# Patient Record
Sex: FEMALE | Race: WHITE | NOT HISPANIC OR LATINO | Employment: OTHER | URBAN - METROPOLITAN AREA
[De-identification: names, ages, dates, MRNs, and addresses within clinical notes are randomized per-mention and may not be internally consistent; named-entity substitution may affect disease eponyms.]

---

## 2023-11-03 ENCOUNTER — OFFICE VISIT (OUTPATIENT)
Dept: NEUROSURGERY | Facility: CLINIC | Age: 88
End: 2023-11-03
Payer: MEDICARE

## 2023-11-03 VITALS
TEMPERATURE: 97.6 F | RESPIRATION RATE: 16 BRPM | SYSTOLIC BLOOD PRESSURE: 122 MMHG | HEIGHT: 64 IN | WEIGHT: 134.4 LBS | HEART RATE: 81 BPM | BODY MASS INDEX: 22.94 KG/M2 | DIASTOLIC BLOOD PRESSURE: 84 MMHG

## 2023-11-03 DIAGNOSIS — M54.16 RIGHT LUMBAR RADICULOPATHY: ICD-10-CM

## 2023-11-03 DIAGNOSIS — S32.058A: Primary | ICD-10-CM

## 2023-11-03 PROBLEM — S32.059A: Status: ACTIVE | Noted: 2023-11-03

## 2023-11-03 PROCEDURE — 1125F AMNT PAIN NOTED PAIN PRSNT: CPT | Performed by: NEUROLOGICAL SURGERY

## 2023-11-03 PROCEDURE — 1036F TOBACCO NON-USER: CPT | Performed by: NEUROLOGICAL SURGERY

## 2023-11-03 PROCEDURE — 99215 OFFICE O/P EST HI 40 MIN: CPT | Performed by: NEUROLOGICAL SURGERY

## 2023-11-03 PROCEDURE — 1159F MED LIST DOCD IN RCRD: CPT | Performed by: NEUROLOGICAL SURGERY

## 2023-11-03 RX ORDER — POTASSIUM &MAGNESIUM ASPARTATE 250-250 MG
500 CAPSULE ORAL
COMMUNITY

## 2023-11-03 RX ORDER — FAMOTIDINE 20 MG/1
20 TABLET, FILM COATED ORAL DAILY
COMMUNITY

## 2023-11-03 ASSESSMENT — PAIN SCALES - GENERAL: PAINLEVEL: 8

## 2023-11-03 NOTE — PROGRESS NOTES
[facility] [place]  Department of Neurological Surgery  Established Patient Visit    History of Present Illness  Brittny Astudillo is a 88 y.o. year old female who presents to the spine clinic in follow up with with her  to go over the low back pain and right leg pain that she has been experiencing now for about a year.  Patient states that after her last lumbar operation she did well for a number of years.  Last summer she was working in the garden and she fell and after the fall she had significant pain in her right leg.  She does occasionally get some symptoms on the left side but it is predominantly the right hamstring it is worse when she tries to bend over.  She is wearing a lumbar brace which gives her some back support and some relief of the back pain.  She had plain x-rays which demonstrate that she has significant osteoporosis.  She also has an old L1 compression fracture that dates back at least to 2018.  She had an MRI done this summer.  And on the MRI I believe she has a right L5 pars fracture and so the facet is no longer functional.  She has not lost control of her bowel or bladder.    Patient's BMI is Body mass index is 23.07 kg/m².    14/14 systems reviewed and negative other than what is listed in the history of present illness    Patient Active Problem List   Diagnosis    Closed fracture of fifth lumbar vertebra (CMS/HCC)     Past Medical History:   Diagnosis Date    Personal history of other malignant neoplasm of skin     History of malignant neoplasm of skin     Past Surgical History:   Procedure Laterality Date    APPENDECTOMY  05/08/2018    Appendectomy    BACK SURGERY  05/08/2018    Back Surgery    COLONOSCOPY  05/08/2018    Colonoscopy    HERNIA REPAIR  05/08/2018    Hernia Repair    HYSTERECTOMY  05/08/2018    Hysterectomy    LUMBAR FUSION  07/20/2018    Lumbar Vertebral Fusion    LUMBAR LAMINECTOMY  07/20/2018    Laminectomy Lumbar     Social History     Tobacco Use     Smoking status: Former     Types: Cigarettes    Smokeless tobacco: Never   Substance Use Topics    Alcohol use: Yes     Comment: socially     family history is not on file.    Current Outpatient Medications:     cranberry 500 mg capsule, Take 500 mg by mouth once daily., Disp: , Rfl:     diphenhydrAMINE-acetaminophen (Tylenol PM)  mg per tablet, Take 1 tablet by mouth as needed at bedtime., Disp: , Rfl:     famotidine (Pepcid) 20 mg tablet, Take 1 tablet (20 mg) by mouth once daily., Disp: , Rfl:     NON FORMULARY, Prevagen - once daily, Disp: , Rfl:   No Known Allergies    Physical Examination:      General: NAD, AOx 3,  no aphasia or dysarthria, normal fund of knowledge  Cranial Nerves II-XII: VFF, PERRL, EOMI, Face Symm, Facial SILT, Palate/Tongue midline and symmetric, she is a bit hard of hearing and she is not wearing her hearing aids  Motor: 5/5 Throughout all extremities,  No drift, no dysmetria on finger to nose  Sensation: SILT and PP throughout all extremities  DTRS: 1+ Throughout, No Hoffmans or Clonus  Gait clearly demonstrates that she favors the right leg and has a slight limp.  She has a negative Romberg and she can toe and heel walk      Results:  I personally reviewed and interpreted the imaging results which included the MRI done in August 2023 of the lumbar spine.  It demonstrates that she has some foraminal stenosis at multiple levels but although there is not a mention of it in the report I believe she has a fracture of the right pars resulting in an unstable right L5-S1 facet and severe foraminal stenosis.    Assessment and Plan:      Brittny Astudillo is a 88 y.o. year old female who presents to the spine clinic in follow up with what I suspect to be a new fracture at L5 on the right.  I am ordering a DEXA study and a CAT scan.  We will bring her back in to discuss her options.  Unfortunately if her bone density is as poor as I suspect we may be limited in what we can offer.      I  have reviewed all prior documentation and reviewed the electronic medical record since admission. I have personally have reviewed all advanced imaging not just the reports and used my interpretation as documented as the relevant findings. I have reviewed the risks and benefits of all treatment recommendations listed in this note with the patient and family. I spent a total of 40 minutes in service to this patient's care during this date of service.      The above clinical summary has been dictated with voice recognition software. It has not been proofread for grammatical errors, typographical mistakes, or other semantic inconsistencies.    Thank you for visiting our office today. It was our pleasure to take part in your healthcare.     Do not hesitate to call with any questions regarding your plan of care after leaving. My office can be reached at [phone] M-F 8am-4pm.     To clinicians, thank you very much for this kind referral. It is a privilege to partner with you in the care of your patients. My office would be delighted to assist you with any further consultations or with questions regarding the plan of care outlined. Do not hesitate to call the office or contact me directly.     Sincerely,    Archie Dos Santos MD, FAANS, FACS  Board Certified Neurological Surgeon  , Department of Neurological Surgery  [facility] [employer] [facility]    [facility]  [address]  Medical [facility] [address]  [place] [id number]    [facility]  [address]  [address]  [place] [address]    Phone: [phone]  Fax: [phone]

## 2023-11-28 ENCOUNTER — OFFICE VISIT (OUTPATIENT)
Dept: NEUROSURGERY | Facility: CLINIC | Age: 88
End: 2023-11-28
Payer: MEDICARE

## 2023-11-28 VITALS
WEIGHT: 133.8 LBS | HEART RATE: 73 BPM | BODY MASS INDEX: 23.71 KG/M2 | TEMPERATURE: 97.5 F | SYSTOLIC BLOOD PRESSURE: 144 MMHG | DIASTOLIC BLOOD PRESSURE: 80 MMHG | RESPIRATION RATE: 18 BRPM | HEIGHT: 63 IN

## 2023-11-28 DIAGNOSIS — S32.058K: Primary | ICD-10-CM

## 2023-11-28 PROCEDURE — 1159F MED LIST DOCD IN RCRD: CPT | Performed by: NEUROLOGICAL SURGERY

## 2023-11-28 PROCEDURE — 99214 OFFICE O/P EST MOD 30 MIN: CPT | Performed by: NEUROLOGICAL SURGERY

## 2023-11-28 PROCEDURE — 1126F AMNT PAIN NOTED NONE PRSNT: CPT | Performed by: NEUROLOGICAL SURGERY

## 2023-11-28 PROCEDURE — 1036F TOBACCO NON-USER: CPT | Performed by: NEUROLOGICAL SURGERY

## 2023-11-28 ASSESSMENT — PAIN SCALES - GENERAL: PAINLEVEL: 0-NO PAIN

## 2023-11-28 ASSESSMENT — LIFESTYLE VARIABLES: HOW OFTEN DO YOU HAVE A DRINK CONTAINING ALCOHOL: 2-3 TIMES A WEEK

## 2023-11-28 NOTE — PROGRESS NOTES
[facility] [place]  Department of Neurological Surgery  Established Patient Visit    History of Present Illness  Brittny Astudillo is a 88 y.o. year old female who presents to the spine clinic in follow up with her daughter and .  She recently had her DEXA study and a CAT scan of the lumbar spine.  The DEXA study shows that she has osteopenia but with a history of fractures I am leaning more towards osteoporosis.  She does have a fracture on the right side what I was calling L5 because it is the last intact vertebra but based on the vestigial rib at what I was calling L1 and now we are calling T12 the fracture is truly on L4.  This is on the right side where she previously had surgery.  This is also consistent with the pattern of her pain down the right leg whenever she tries to weight-bear.    I do not think she is a surgical candidate.  In order to decompress the nerve she will destabilize her spine and require an instrumented fusion.  I am not willing to offer that surgery in this situation.  I did tell her to get other opinions.  I am recommending that she get involved with physical medicine and rehabilitation.  I think she needs to learn how to use the cane properly.  I think learning how to function with this disability is the best of all of her options.  She lives near [place] so I will give her the names of some [facility] PM[facility]R doctors.    Patient's BMI is Body mass index is 23.7 kg/m².    14/14 systems reviewed and negative other than what is listed in the history of present illness    Patient Active Problem List   Diagnosis    Closed fracture of fifth lumbar vertebra (CMS/HCC)     Past Medical History:   Diagnosis Date    Personal history of other malignant neoplasm of skin     History of malignant neoplasm of skin     Past Surgical History:   Procedure Laterality Date    APPENDECTOMY  05/08/2018    Appendectomy    BACK SURGERY  05/08/2018    Back Surgery    COLONOSCOPY  05/08/2018     Colonoscopy    HERNIA REPAIR  05/08/2018    Hernia Repair    HYSTERECTOMY  05/08/2018    Hysterectomy    LUMBAR FUSION  07/20/2018    Lumbar Vertebral Fusion    LUMBAR LAMINECTOMY  07/20/2018    Laminectomy Lumbar     Social History     Tobacco Use    Smoking status: Former     Types: Cigarettes    Smokeless tobacco: Never   Substance Use Topics    Alcohol use: Yes     Comment: socially     family history is not on file.    Current Outpatient Medications:     cranberry 500 mg capsule, Take 500 mg by mouth once daily., Disp: , Rfl:     diphenhydrAMINE-acetaminophen (Tylenol PM)  mg per tablet, Take 1 tablet by mouth as needed at bedtime., Disp: , Rfl:     famotidine (Pepcid) 20 mg tablet, Take 1 tablet (20 mg) by mouth once daily., Disp: , Rfl:     NON FORMULARY, Prevagen - once daily, Disp: , Rfl:   No Known Allergies      Results:  I personally reviewed and interpreted the imaging results which included the DEXA and CAT scan I described above    Assessment and Plan:      Brittny Astudillo is a 88 y.o. year old female who presents to the spine clinic in follow up with her daughter to discuss her right L4 pars fracture and right L5 radiculopathy.  I do not think this is a surgical situation because of her age and osteopenia/osteoporosis.  I am recommending working with a physical medicine and rehabilitation specialist.  I did also tell them to get second opinions about surgery as well.      I have reviewed all prior documentation and reviewed the electronic medical record since admission. I have personally have reviewed all advanced imaging not just the reports and used my interpretation as documented as the relevant findings. I have reviewed the risks and benefits of all treatment recommendations listed in this note with the patient and family. I spent a total of 35 minutes in service to this patient's care during this date of service.      The above clinical summary has been dictated with voice recognition  software. It has not been proofread for grammatical errors, typographical mistakes, or other semantic inconsistencies.    Thank you for visiting our office today. It was our pleasure to take part in your healthcare.     Do not hesitate to call with any questions regarding your plan of care after leaving. My office can be reached at [phone] M-F 8am-4pm.     To clinicians, thank you very much for this kind referral. It is a privilege to partner with you in the care of your patients. My office would be delighted to assist you with any further consultations or with questions regarding the plan of care outlined. Do not hesitate to call the office or contact me directly.     Sincerely,    Archie Dos Santos MD, FAANS, FACS  Board Certified Neurological Surgeon  , Department of Neurological Surgery  [facility] [employer] [facility]    [facility]  [address] [place] [address]., [address]  Medical [facility] [address]  [place] [address]    [facility]  [address]  [address]  [place] [address]    Phone: [phone]  Fax: [phone]

## 2024-01-12 ENCOUNTER — APPOINTMENT (OUTPATIENT)
Dept: NEUROSURGERY | Facility: CLINIC | Age: 89
End: 2024-01-12
Payer: MEDICARE

## 2024-05-22 ENCOUNTER — LAB REQUISITION (OUTPATIENT)
Dept: LAB | Facility: HOSPITAL | Age: 89
End: 2024-05-22
Payer: MEDICARE

## 2024-05-22 DIAGNOSIS — R19.7 DIARRHEA, UNSPECIFIED: ICD-10-CM

## 2024-05-22 PROCEDURE — 87506 IADNA-DNA/RNA PROBE TQ 6-11: CPT

## 2024-05-22 PROCEDURE — 87329 GIARDIA AG IA: CPT

## 2024-05-22 PROCEDURE — 87328 CRYPTOSPORIDIUM AG IA: CPT

## 2024-05-23 LAB

## 2024-05-25 LAB
G LAMBLIA AG STL QL IA: NEGATIVE
O+P STL MICRO: NEGATIVE

## 2024-06-11 LAB — CRYPTOSPORIDIUM ANTIGEN BY EIA: NEGATIVE
